# Patient Record
Sex: MALE
[De-identification: names, ages, dates, MRNs, and addresses within clinical notes are randomized per-mention and may not be internally consistent; named-entity substitution may affect disease eponyms.]

---

## 2019-01-27 ENCOUNTER — HOSPITAL ENCOUNTER (EMERGENCY)
Dept: HOSPITAL 92 - ERS | Age: 84
Discharge: HOME | End: 2019-01-27
Payer: SELF-PAY

## 2019-01-27 DIAGNOSIS — J06.9: Primary | ICD-10-CM

## 2019-01-27 PROCEDURE — 71046 X-RAY EXAM CHEST 2 VIEWS: CPT

## 2019-01-27 NOTE — RAD
RADIOGRAPH CHEST 2 VIEWS:

 

HISTORY: 

An 84-year-old male with productive cough, chills, and subjective fever.

 

FINDINGS:

There is hyperinflation of the lungs, consistent with COPD.  There is no evidence of air space densit
y, pneumothorax, or pulmonary edema.  There is no cardiomegaly or pleural effusion.

 

IMPRESSION:

1) No acute cardiopulmonary findings.

2) Emphysema.

 

 

peace []

 

POS: [place]